# Patient Record
Sex: MALE | Race: WHITE
[De-identification: names, ages, dates, MRNs, and addresses within clinical notes are randomized per-mention and may not be internally consistent; named-entity substitution may affect disease eponyms.]

---

## 2019-06-28 ENCOUNTER — HOSPITAL ENCOUNTER (EMERGENCY)
Dept: HOSPITAL 11 - JP.ED | Age: 44
Discharge: HOME | End: 2019-06-28
Payer: COMMERCIAL

## 2019-06-28 DIAGNOSIS — I95.9: ICD-10-CM

## 2019-06-28 DIAGNOSIS — T78.1XXA: Primary | ICD-10-CM

## 2019-06-28 DIAGNOSIS — Z79.84: ICD-10-CM

## 2019-06-28 DIAGNOSIS — R74.0: ICD-10-CM

## 2019-06-28 DIAGNOSIS — E86.0: ICD-10-CM

## 2019-06-28 DIAGNOSIS — D72.829: ICD-10-CM

## 2019-06-28 DIAGNOSIS — E11.9: ICD-10-CM

## 2019-06-28 DIAGNOSIS — N28.9: ICD-10-CM

## 2019-06-28 DIAGNOSIS — Z79.899: ICD-10-CM

## 2019-06-28 PROCEDURE — 87040 BLOOD CULTURE FOR BACTERIA: CPT

## 2019-06-28 PROCEDURE — 96361 HYDRATE IV INFUSION ADD-ON: CPT

## 2019-06-28 PROCEDURE — 99284 EMERGENCY DEPT VISIT MOD MDM: CPT

## 2019-06-28 PROCEDURE — 82962 GLUCOSE BLOOD TEST: CPT

## 2019-06-28 PROCEDURE — 96374 THER/PROPH/DIAG INJ IV PUSH: CPT

## 2019-06-28 PROCEDURE — 71045 X-RAY EXAM CHEST 1 VIEW: CPT

## 2019-06-28 PROCEDURE — 85025 COMPLETE CBC W/AUTO DIFF WBC: CPT

## 2019-06-28 PROCEDURE — 36415 COLL VENOUS BLD VENIPUNCTURE: CPT

## 2019-06-28 PROCEDURE — 80053 COMPREHEN METABOLIC PANEL: CPT

## 2019-06-28 PROCEDURE — 96372 THER/PROPH/DIAG INJ SC/IM: CPT

## 2019-06-28 PROCEDURE — 96375 TX/PRO/DX INJ NEW DRUG ADDON: CPT

## 2019-06-28 PROCEDURE — 83605 ASSAY OF LACTIC ACID: CPT

## 2019-06-28 NOTE — EDM.PDOC
<Joan Marcum - Last Filed: 06/28/19 06:46>





ED HPI GENERAL MEDICAL PROBLEM





- General


Chief Complaint: Diabetic Complaint


Stated Complaint: DOESN'T FEEL WELL


Time Seen by Provider: 06/28/19 05:13


Source of Information: Reports: Patient


History Limitations: Reports: No Limitations





- History of Present Illness


INITIAL COMMENTS - FREE TEXT/NARRATIVE: 





pt was at [facility] and he woke up feeling like his bs was low. He drank 

2 cans of mountain dew and was able to get his bs. His bp was low when the 

medics  looked at him. 


Onset: Today, Sudden, Other (pt started and he was itching all over. He then 

thought his bs was low and he drank an can and ahalf of mountain dew. His blood 

sugar went up but his bs was low and he went from itching all over to shaking 

chills. )


Duration: Hour(s):


Location: Reports: Generalized


Associated Symptoms: Reports: Other ( chilling. )


  ** denies pain


Pain Score (Numeric/FACES): 0





- Related Data


 Allergies











Allergy/AdvReac Type Severity Reaction Status Date / Time


 


No Known Allergies Allergy   Verified 06/28/19 05:01











Home Meds: 


 Home Meds





Lisinopril 10 mg PO DAILY 06/28/19 [History]


metFORMIN [Glucophage] 500 mg PO BIDMEALS 06/28/19 [History]











Past Medical History


Cardiovascular History: Reports: Hypertension


Endocrine/Metabolic History: Reports: Diabetes, Type II





- Infectious Disease History


Infectious Disease History: Reports: Chicken Pox





- Past Surgical History


HEENT Surgical History: Reports: Adenoidectomy, Tonsillectomy


GI Surgical History: Reports: Appendectomy





Social & Family History





- Tobacco Use


Smoking Status *Q: Never Smoker





- Caffeine Use


Caffeine Use: Reports: Soda





- Recreational Drug Use


Recreational Drug Use: No





ED ROS GENERAL





- Review of Systems


Review Of Systems: See Below


Constitutional: Reports: Chills


HEENT: Reports: No Symptoms


Respiratory: Reports: No Symptoms


Cardiovascular: Reports: No Symptoms


Endocrine: Reports: No Symptoms


GI/Abdominal: Reports: No Symptoms


: Reports: No Symptoms


Musculoskeletal: Reports: No Symptoms


Skin: Reports: Other ( total body rash-- redness. )


Neurological: Reports: No Symptoms


Psychiatric: Reports: No Symptoms





ED EXAM GENERAL NO PERIP PULSE





- Physical Exam


Text/Narrative:: 





pt has a r He is shaking now. red rash everywhere. [place] has been at camp for the 

week. He had alot of insect bites and he had one tick bite. He was not aware of 

any bites during the nite.  He no longer feels itchy. 


Exam Limited By: No Limitations


General Appearance: Alert, Mild Distress, Severe Distress


Ears: Normal TMs


Nose: Normal Inspection


Throat/Mouth: Other ( no swelling. )


Head: Atraumatic


Neck: Normal Inspection


Respiratory/Chest: No Respiratory Distress


Cardiovascular: Regular Rate, Rhythm, Tachycardia


GI/Abdominal: Soft, Non-Tender


 (Male) Exam: Deferred


Rectal (Males) Exam: Deferred


Back Exam: Normal Inspection


Extremities: Normal Inspection


Neurological: Alert, Oriented, Normal Cognition


Psychiatric: Normal Affect


Skin Exam: Erythema, Rash





Course





- Vital Signs


Last Recorded V/S: 


 Last Vital Signs











Temp  96.8 F   06/28/19 06:23


 


Pulse  101 H  06/28/19 06:23


 


Resp  22 H  06/28/19 06:23


 


BP  133/82   06/28/19 06:23


 


Pulse Ox  99   06/28/19 06:23














- Orders/Labs/Meds


Orders: 


 Active Orders 24 hr











 Category Date Time Status


 


 CULTURE BLOOD [BC] Urgent Lab  06/28/19 06:05 Received


 


 CULTURE BLOOD [BC] Urgent Lab  06/28/19 06:10 Received


 


 UA W/MICROSCOPIC [URIN] Urgent Lab  06/28/19 05:13 Ordered


 


 Blood Culture x2 Reflex Set [OM.PC] Urgent Oth  06/28/19 05:44 Ordered











Labs: 


 Laboratory Tests











  06/28/19 06/28/19 06/28/19 Range/Units





  05:13 05:37 05:46 


 


WBC  30.3 H*    (4.5-11.0)  K/uL


 


RBC  5.35    (4.30-5.90)  M/uL


 


Hgb  14.4    (12.0-15.0)  g/dL


 


Hct  45.1    (40.0-54.0)  %


 


MCV  84    (80-98)  fL


 


MCH  27    (27-31)  pg


 


MCHC  32    (32-36)  %


 


Plt Count  334    (150-400)  K/uL


 


Neut % (Auto)  83 H    (36-66)  %


 


Lymph % (Auto)  11 L    (24-44)  %


 


Mono % (Auto)  5    (2-6)  %


 


Eos % (Auto)  1 L    (2-4)  %


 


Baso % (Auto)  0    (0-1)  %


 


Sodium   139 L   (140-148)  mmol/L


 


Potassium   5.1   (3.6-5.2)  mmol/L


 


Chloride   103   (100-108)  mmol/L


 


Carbon Dioxide   24   (21-32)  mmol/L


 


Anion Gap   17.1 H   (5.0-14.0)  mmol/L


 


BUN   21 H   (7-18)  mg/dL


 


Creatinine   1.6 H   (0.8-1.3)  mg/dL


 


Est Cr Clr Drug Dosing   54.51   mL/min


 


Estimated GFR (MDRD)   47 L   (>60)  


 


Glucose   287 H   ()  mg/dL


 


Lactic Acid    5.2 H  (0.4-2.0)  mmol/L


 


Calcium   8.9   (8.5-10.1)  mg/dL


 


Total Bilirubin   0.3   (0.2-1.0)  mg/dL


 


AST   21   (15-37)  U/L


 


ALT   36   (12-78)  U/L


 


Alkaline Phosphatase   54   ()  U/L


 


Total Protein   6.4   (6.4-8.2)  g/dL


 


Albumin   3.4   (3.4-5.0)  g/dL


 


Globulin   3.0   (2.3-3.5)  g/dL


 


Albumin/Globulin Ratio   1.1 L   (1.2-2.2)  











Meds: 


Medications














Discontinued Medications














Generic Name Dose Route Start Last Admin





  Trade Name Freq  PRN Reason Stop Dose Admin


 


Diphenhydramine HCl  50 mg  06/28/19 05:23  06/28/19 05:31





  Benadryl  IVPUSH  06/28/19 05:24  50 mg





  ONETIME ONE   Administration





     





     





     





     


 


Epinephrine HCl  0.3 mg  06/28/19 05:23  06/28/19 05:33





  Adrenalin  SUBCUT  06/28/19 05:24  Not Given





  ONETIME ONE   





     





     





     





     


 


Epinephrine HCl  0.2 mg  06/28/19 05:25  06/28/19 05:29





  Adrenalin  SUBCUT  06/28/19 05:26  0.2 mg





  ONETIME ONE   Administration





     





     





     





     


 


Sodium Chloride  1,000 mls @ 999 mls/hr  06/28/19 05:30  06/28/19 05:31





  Normal Saline  IV   999 mls/hr





  ASDIRECTED VICKI   Administration





     





     





     





     


 


Sodium Chloride  1,000 mls @ 999 mls/hr  06/28/19 06:00  06/28/19 06:13





  Normal Saline  IV   999 mls/hr





  ASDIRECTED VICKI   Administration





     





     





     





     


 


Sodium Chloride  1,000 mls @ 999 mls/hr  06/28/19 06:45  





  Normal Saline  IV   





  ASDIRECTED VICKI   





     





     





     





     


 


Methylprednisolone Sodium Succinate  125 mg  06/28/19 05:22  06/28/19 05:28





  Solu-Medrol  IVPUSH  06/28/19 05:23  125 mg





  ONETIME ONE   Administration





     





     





     





     














- Re-Assessments/Exams


Free Text/Narrative Re-Assessment/Exam: 





06/28/19 05:49


pt felt shakey and he drank 2 cans of mountain dew  when he got to the medics 

his bs was in the 140 range. He still is very shakey, His temp is low. He felt 

well when he went to bed. He woke up itching all over. He states he then got 

very sweaty. He then began to shake . He had a bP of 70 systolic when he got to 

the medics. He had a bp in the 90s when he was first seen. It sounded like an 

allergic reaction to start with. On arrival he as covered with  a red rash.  


06/28/19 05:52


pt was given a small dose of epinephrine, solumedrol and benadryl. He was found 

to have a 30,000 wbc. He has better bp at this time of 108 systolic. 


06/28/19 06:17





06/28/19 06:40


PT FEELS MUCH BVETTER. hIS RASH IS GONE 


06/28/19 06:46


tHIS PT WILL BE GIVEN A 3RD LITER OF FLUID. hE STILL HAS NOT VOIDED. hE IS 

FEELING MUCH BETTER. hIS TEMP IS STAYING DOWN.  hI CREATNINE IS HIGH. hIS BS  

284. 





Departure





- Departure


Disposition: Home, Self-Care 01


Condition: Fair


Clinical Impression: 


 Allergic reaction, Dehydration, Renal insufficiency, Hypotension, Elevated 

lactic acid level, Elevated WBC count








- Discharge Information


Instructions:  Allergies, Adult, Easy-to-Read


Referrals: 


PCP,None [Primary Care Provider] - 


Forms:  ED Department Discharge


Care Plan Goals: 


Increase diet and activity as tolerated. Recheck over the next 1-2 weeks if any 

symptoms develop such as fever, rash or joint pains. Return sooner if he 

developed other concerns.





<Des Huntley - Last Filed: 06/28/19 09:35>





ED EXAM GENERAL NO PERIP PULSE





- Physical Exam


Exam: See Below





Course





- Re-Assessments/Exams


Free Text/Narrative Re-Assessment/Exam: 








06/28/19 07:12


Patient evaluated and treated by Dr. Marcum. After significant rehydration 

he felt much better, symptoms had resolved. In reviewing the timing of his 

labs. His CBC was checked right after the Solu-Medrol and epinephrine was given 

which indicates demargination of white cells. It seems this patient did have 

some type of an allergic reaction which has resolved, he is comfortable being 

and prefers to be discharged.





Departure





- Departure


Time of Disposition: 07:37

## 2019-06-28 NOTE — CRLCR
INDICATION: sob elevated wbc



Single AP view 



Findings: The lungs are clear. Pulmonary vascularity, mediastinum and 

cardiac silhouette are within normal limits. No effusions and no 

pneumothorax. Osseous structures appear unremarkable.



Impression: No evidence of acute cardiopulmonary disease.



Dictated by: Thierno Manning MD @ 06/28/2019 06:48:26



(Electronically Signed)